# Patient Record
Sex: FEMALE | Race: WHITE
[De-identification: names, ages, dates, MRNs, and addresses within clinical notes are randomized per-mention and may not be internally consistent; named-entity substitution may affect disease eponyms.]

---

## 2018-03-16 ENCOUNTER — HOSPITAL ENCOUNTER (EMERGENCY)
Dept: HOSPITAL 17 - PHEFT | Age: 48
Discharge: HOME | End: 2018-03-16
Payer: COMMERCIAL

## 2018-03-16 VITALS — BODY MASS INDEX: 28.79 KG/M2 | HEIGHT: 64 IN | WEIGHT: 168.65 LBS

## 2018-03-16 VITALS
TEMPERATURE: 98.9 F | HEART RATE: 95 BPM | RESPIRATION RATE: 14 BRPM | SYSTOLIC BLOOD PRESSURE: 131 MMHG | OXYGEN SATURATION: 96 % | DIASTOLIC BLOOD PRESSURE: 87 MMHG
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
## 2018-03-16 DIAGNOSIS — T26.91XA: ICD-10-CM

## 2018-03-16 DIAGNOSIS — T65.891A: Primary | ICD-10-CM

## 2018-03-16 DIAGNOSIS — F90.9: ICD-10-CM

## 2018-03-16 DIAGNOSIS — F32.9: ICD-10-CM

## 2018-03-16 DIAGNOSIS — T26.92XA: ICD-10-CM

## 2018-03-16 DIAGNOSIS — Z72.0: ICD-10-CM

## 2018-03-16 PROCEDURE — 99283 EMERGENCY DEPT VISIT LOW MDM: CPT
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
